# Patient Record
Sex: FEMALE | Race: WHITE | Employment: UNEMPLOYED | URBAN - METROPOLITAN AREA
[De-identification: names, ages, dates, MRNs, and addresses within clinical notes are randomized per-mention and may not be internally consistent; named-entity substitution may affect disease eponyms.]

---

## 2022-04-15 ENCOUNTER — OFFICE VISIT (OUTPATIENT)
Dept: URGENT CARE | Age: 57
End: 2022-04-15
Payer: COMMERCIAL

## 2022-04-15 VITALS
TEMPERATURE: 97.7 F | DIASTOLIC BLOOD PRESSURE: 90 MMHG | RESPIRATION RATE: 14 BRPM | HEIGHT: 63 IN | HEART RATE: 93 BPM | SYSTOLIC BLOOD PRESSURE: 159 MMHG | OXYGEN SATURATION: 96 % | WEIGHT: 181 LBS | BODY MASS INDEX: 32.07 KG/M2

## 2022-04-15 DIAGNOSIS — Z11.59 SCREENING FOR VIRAL DISEASE: ICD-10-CM

## 2022-04-15 DIAGNOSIS — R05.9 COUGH: ICD-10-CM

## 2022-04-15 DIAGNOSIS — J40 BRONCHITIS: Primary | ICD-10-CM

## 2022-04-15 LAB — SARS-COV-2 PCR, POC: NEGATIVE

## 2022-04-15 PROCEDURE — 99203 OFFICE O/P NEW LOW 30 MIN: CPT | Performed by: FAMILY MEDICINE

## 2022-04-15 PROCEDURE — 87635 SARS-COV-2 COVID-19 AMP PRB: CPT | Performed by: FAMILY MEDICINE

## 2022-04-15 RX ORDER — AZITHROMYCIN 250 MG/1
TABLET, FILM COATED ORAL
Qty: 6 TABLET | Refills: 0 | Status: SHIPPED | OUTPATIENT
Start: 2022-04-15

## 2022-04-15 RX ORDER — BENZONATATE 200 MG/1
200 CAPSULE ORAL
Qty: 30 CAPSULE | Refills: 0 | Status: SHIPPED | OUTPATIENT
Start: 2022-04-15

## 2022-04-15 NOTE — PROGRESS NOTES
Yvette Hastings is a 61 y.o. female who presents with cough, chest congestion, runny nose x 3 days. Denies fever, SOB. Hx of smoking, gets recurrent bronchitis. The history is provided by the patient. History reviewed. No pertinent past medical history. History reviewed. No pertinent surgical history. History reviewed. No pertinent family history. Social History     Socioeconomic History    Marital status:      Spouse name: Not on file    Number of children: Not on file    Years of education: Not on file    Highest education level: Not on file   Occupational History    Not on file   Tobacco Use    Smoking status: Former Smoker     Years: 40.00     Quit date: 2022     Years since quittin.2    Smokeless tobacco: Never Used   Substance and Sexual Activity    Alcohol use: Not on file    Drug use: Not on file    Sexual activity: Not on file   Other Topics Concern    Not on file   Social History Narrative    Not on file     Social Determinants of Health     Financial Resource Strain:     Difficulty of Paying Living Expenses: Not on file   Food Insecurity:     Worried About [address] in the Last Year: Not on file    [address] in the Last Year: Not on file   Transportation Needs:     Lack of Transportation (Medical): Not on file    Lack of Transportation (Non-Medical):  Not on file   Physical Activity:     Days of Exercise per Week: Not on file    Minutes of Exercise per Session: Not on file   Stress:     Feeling of Stress : Not on file   Social Connections:     Frequency of Communication with Friends and Family: Not on file    Frequency of Social Gatherings with Friends and Family: Not on file    Attends Spiritism Services: Not on file    Active Member of Clubs or Organizations: Not on file    Attends Club or Organization Meetings: Not on file    Marital Status: Not on file   Intimate Partner Violence:     Fear of Current or Ex-Partner: Not on file    Emotionally Abused: Not on file    Physically Abused: Not on file    Sexually Abused: Not on file   Housing Stability:     Unable to Pay for Housing in the Last Year: Not on file    Number of Places Lived in the Last Year: Not on file    Unstable Housing in the Last Year: Not on file                ALLERGIES: Codeine    Review of Systems   Constitutional: Negative for activity change, appetite change and fever. HENT: Positive for congestion, rhinorrhea and sore throat. Respiratory: Positive for cough. Negative for shortness of breath. Gastrointestinal: Negative for diarrhea, nausea and vomiting. Neurological: Positive for headaches. Vitals:    04/15/22 0936   BP: (!) 159/90   Pulse: 93   Resp: 14   Temp: 97.7 °F (36.5 °C)   SpO2: 96%   Weight: 181 lb (82.1 kg)   Height: 5' 3\" (1.6 m)       Physical Exam  Vitals and nursing note reviewed. Constitutional:       General: She is not in acute distress. Appearance: She is well-developed. She is not diaphoretic. HENT:      Right Ear: Tympanic membrane, ear canal and external ear normal.      Left Ear: Tympanic membrane, ear canal and external ear normal.      Nose: Congestion present. Right Sinus: No maxillary sinus tenderness or frontal sinus tenderness. Left Sinus: No maxillary sinus tenderness or frontal sinus tenderness. Mouth/Throat:      Pharynx: No oropharyngeal exudate or posterior oropharyngeal erythema. Tonsils: No tonsillar abscesses. Cardiovascular:      Rate and Rhythm: Normal rate and regular rhythm. Heart sounds: Normal heart sounds. Pulmonary:      Effort: Pulmonary effort is normal. No respiratory distress. Breath sounds: Normal breath sounds. No stridor. No wheezing, rhonchi or rales. Lymphadenopathy:      Cervical: No cervical adenopathy. Neurological:      Mental Status: She is alert. Psychiatric:         Behavior: Behavior normal.         Thought Content:  Thought content normal.         Judgment: Judgment normal.         MDM    ICD-10-CM ICD-9-CM   1. Bronchitis  J40 490   2. Cough  R05.9 786.2   3. Screening for viral disease  Z11.59 V73.99       Orders Placed This Encounter    POCT COVID-19, SARS-COV-2, PCR     Order Specific Question:   Is this test for diagnosis or screening? Answer:   Diagnosis of ill patient     Order Specific Question:   Symptomatic for COVID-19 as defined by CDC? Answer:   Yes     Order Specific Question:   Date of Symptom Onset     Answer:   4/12/2022     Order Specific Question:   Hospitalized for COVID-19? Answer:   No     Order Specific Question:   Admitted to ICU for COVID-19? Answer:   No     Order Specific Question:   Employed in healthcare setting? Answer:   Unknown     Order Specific Question:   Resident in a congregate (group) care setting? Answer:   No     Order Specific Question:   Pregnant? Answer:   No     Order Specific Question:   Previously tested for COVID-19? Answer:   Unknown    azithromycin (ZITHROMAX) 250 mg tablet     Sig: Take two tablets today then one tablet daily     Dispense:  6 Tablet     Refill:  0    benzonatate (TESSALON) 200 mg capsule     Sig: Take 1 Capsule by mouth three (3) times daily as needed for Cough. Dispense:  30 Capsule     Refill:  0        The patient is to follow up with PCP [facility]    If signs and symptoms become worse the pt is to go to the ER.      Results for orders placed or performed in visit on 04/15/22   POCT COVID-19, SARS-COV-2, PCR   Result Value Ref Range    SARS-COV-2 PCR, POC Negative Negative       Procedures

## 2022-05-25 ENCOUNTER — OFFICE VISIT (OUTPATIENT)
Dept: URGENT CARE | Age: 57
End: 2022-05-25
Payer: COMMERCIAL

## 2022-05-25 VITALS
TEMPERATURE: 98 F | DIASTOLIC BLOOD PRESSURE: 70 MMHG | OXYGEN SATURATION: 96 % | HEART RATE: 89 BPM | SYSTOLIC BLOOD PRESSURE: 165 MMHG | RESPIRATION RATE: 18 BRPM

## 2022-05-25 DIAGNOSIS — J20.9 ACUTE BRONCHITIS, UNSPECIFIED ORGANISM: Primary | ICD-10-CM

## 2022-05-25 PROCEDURE — 99214 OFFICE O/P EST MOD 30 MIN: CPT | Performed by: FAMILY MEDICINE

## 2022-05-25 RX ORDER — GUAIFENESIN AND DEXTROMETHORPHAN HYDROBROMIDE 1200; 60 MG/1; MG/1
1 TABLET, EXTENDED RELEASE ORAL 2 TIMES DAILY
Qty: 20 TABLET | Refills: 0 | Status: SHIPPED | OUTPATIENT
Start: 2022-05-25

## 2022-05-25 RX ORDER — ALBUTEROL SULFATE 90 UG/1
2 AEROSOL, METERED RESPIRATORY (INHALATION)
Qty: 18 G | Refills: 0 | Status: SHIPPED | OUTPATIENT
Start: 2022-05-25

## 2022-05-25 RX ORDER — PREDNISONE 10 MG/1
TABLET ORAL
Qty: 21 TABLET | Refills: 0 | Status: SHIPPED | OUTPATIENT
Start: 2022-05-25

## 2022-05-25 RX ORDER — CEFDINIR 300 MG/1
300 CAPSULE ORAL 2 TIMES DAILY
Qty: 20 CAPSULE | Refills: 0 | Status: SHIPPED | OUTPATIENT
Start: 2022-05-25

## 2022-05-25 NOTE — PROGRESS NOTES
Cough  The history is provided by the patient. This is a new problem. The current episode started more than 1 week ago. The problem occurs every few minutes. The problem has not changed (since last visit 3 weeks before) since onset. The cough is productive of sputum. There has been no fever. Pertinent negatives include no chest pain, no sore throat, no myalgias, no shortness of breath, no wheezing, no nausea and no vomiting. She has tried nothing for the symptoms. She is a smoker (quit in ). Her past medical history is significant for bronchitis. Her past medical history does not include asthma. History reviewed. No pertinent past medical history. History reviewed. No pertinent surgical history. History reviewed. No pertinent family history. Social History     Socioeconomic History    Marital status:      Spouse name: Not on file    Number of children: Not on file    Years of education: Not on file    Highest education level: Not on file   Occupational History    Not on file   Tobacco Use    Smoking status: Former Smoker     Years: 40.00     Quit date: 2022     Years since quittin.3    Smokeless tobacco: Never Used   Substance and Sexual Activity    Alcohol use: Not on file    Drug use: Not on file    Sexual activity: Not on file   Other Topics Concern    Not on file   Social History Narrative    Not on file     Social Determinants of Health     Financial Resource Strain:     Difficulty of Paying Living Expenses: Not on file   Food Insecurity:     Worried About [address] in the Last Year: Not on file    [address] in the Last Year: Not on file   Transportation Needs:     Lack of Transportation (Medical): Not on file    Lack of Transportation (Non-Medical):  Not on file   Physical Activity:     Days of Exercise per Week: Not on file    Minutes of Exercise per Session: Not on file   Stress:     Feeling of Stress : Not on file   Social Connections:     Frequency of Communication with Friends and Family: Not on file    Frequency of Social Gatherings with Friends and Family: Not on file    Attends Jainism Services: Not on file    Active Member of Clubs or Organizations: Not on file    Attends Club or Organization Meetings: Not on file    Marital Status: Not on file   Intimate Partner Violence:     Fear of Current or Ex-Partner: Not on file    Emotionally Abused: Not on file    Physically Abused: Not on file    Sexually Abused: Not on file   Housing Stability:     Unable to Pay for Housing in the Last Year: Not on file    Number of [place] in the Last Year: Not on file    Unstable Housing in the Last Year: Not on file                ALLERGIES: Codeine    Review of Systems   HENT: Negative for sore throat. Respiratory: Positive for cough. Negative for shortness of breath and wheezing. Cardiovascular: Negative for chest pain. Gastrointestinal: Negative for nausea and vomiting. Musculoskeletal: Negative for myalgias. All other systems reviewed and are negative. Vitals:    05/25/22 1018   BP: (!) 165/70   Pulse: 89   Resp: 18   Temp: 98 °F (36.7 °C)   SpO2: 96%       Physical Exam  Vitals and nursing note reviewed. Constitutional:       General: She is not in acute distress. Appearance: She is not ill-appearing. Pulmonary:      Effort: Pulmonary effort is normal. No respiratory distress. Breath sounds: Decreased breath sounds present. No wheezing or rhonchi. Comments: Bronchial breathing           MDM    Procedures        ICD-10-CM ICD-9-CM    1. Acute bronchitis, unspecified organism  J20.9 466.0      Medications Ordered Today   Medications    albuterol (PROVENTIL HFA, VENTOLIN HFA, PROAIR HFA) 90 mcg/actuation inhaler     Sig: Take 2 Puffs by inhalation every six (6) hours as needed for Wheezing.      Dispense:  18 g     Refill:  0    dextromethorphan-guaiFENesin (Mucinex DM) 60-1,200 mg Tb12 Sig: Take 1 Tablet by mouth two (2) times a day. Dispense:  20 Tablet     Refill:  0    predniSONE (STERAPRED DS) 10 mg dose pack     Sig: As directed     Dispense:  21 Tablet     Refill:  0    cefdinir (OMNICEF) 300 mg capsule     Sig: Take 1 Capsule by mouth two (2) times a day. Dispense:  20 Capsule     Refill:  0     No results found for any visits on 05/25/22. The patients condition was discussed with the patient and they understand. The patient is to follow up with primary care doctor. If signs and symptoms become worse the pt is to go to the ER. The patient is to take medications as prescribed.

## 2023-05-19 RX ORDER — AZITHROMYCIN 250 MG/1
TABLET, FILM COATED ORAL
COMMUNITY
Start: 2022-04-15

## 2023-05-19 RX ORDER — CEFDINIR 300 MG/1
300 CAPSULE ORAL 2 TIMES DAILY
COMMUNITY
Start: 2022-05-25

## 2023-05-19 RX ORDER — BENZONATATE 200 MG/1
200 CAPSULE ORAL 3 TIMES DAILY PRN
COMMUNITY
Start: 2022-04-15

## 2023-05-19 RX ORDER — ALBUTEROL SULFATE 90 UG/1
2 AEROSOL, METERED RESPIRATORY (INHALATION) EVERY 6 HOURS PRN
COMMUNITY
Start: 2022-05-25

## 2023-05-19 RX ORDER — PREDNISONE 10 MG/1
TABLET ORAL
COMMUNITY
Start: 2022-05-25

## 2023-05-19 RX ORDER — GUAIFENESIN AND DEXTROMETHORPHAN HYDROBROMIDE 1200; 60 MG/1; MG/1
1 TABLET, EXTENDED RELEASE ORAL 2 TIMES DAILY
COMMUNITY
Start: 2022-05-25

## 2023-11-06 ENCOUNTER — OFFICE VISIT (OUTPATIENT)
Age: 58
End: 2023-11-06

## 2023-11-06 VITALS
TEMPERATURE: 97.7 F | HEART RATE: 88 BPM | DIASTOLIC BLOOD PRESSURE: 84 MMHG | WEIGHT: 193.5 LBS | BODY MASS INDEX: 34.28 KG/M2 | SYSTOLIC BLOOD PRESSURE: 170 MMHG | OXYGEN SATURATION: 96 %

## 2023-11-06 DIAGNOSIS — U07.1 COVID-19: Primary | ICD-10-CM

## 2023-11-06 LAB
INFLUENZA A ANTIGEN, POC: NEGATIVE
INFLUENZA B ANTIGEN, POC: NEGATIVE
Lab: ABNORMAL
QC PASS/FAIL: ABNORMAL
SARS-COV-2 RDRP RESP QL NAA+PROBE: POSITIVE
VALID INTERNAL CONTROL, POC: NORMAL

## 2023-11-06 RX ORDER — BENZONATATE 200 MG/1
200 CAPSULE ORAL 3 TIMES DAILY PRN
Qty: 30 CAPSULE | Refills: 0 | Status: SHIPPED | OUTPATIENT
Start: 2023-11-06

## 2023-11-06 NOTE — PROGRESS NOTES
Jacqueline Tyler (:  1965) is a 60 y.o. female,Established patient, here for evaluation of the following chief complaint(s):  URI ([id number] )      ASSESSMENT/PLAN:  Visit Diagnoses and Associated Orders       COVID-19    -  Primary    POCT COVID-19 Rapid, NAAT [LHD016 Custom]      AMB POC INFLUENZA A  AND B REAL-TIME RT-PCR [72421 CPT(R)]      benzonatate (TESSALON) 200 MG capsule [73049]      nirmatrelvir/ritonavir 300/100 (PAXLOVID, 300/100,) 20 x 150 MG & 10 x 100MG TBPK [305544]                   The patient was counseled for a POSITIVE test result for COVID-19. The following information was given to the patient:    The COVID-19 test result was positive. Mild and stable symptoms are managed at home. Day 0 is your first day of symptoms or a positive test.  Day 1 is the first day after your symptoms started or your test specimen was collected. If you have COVID-19 or have symptoms  Stay home for at least 5 days and isolate from others in your home. Wear a well-fitted mask if you must be around others in your home. End isolation after 5 full days if you are fever-free for 24 hours (without the use of fever-reducing medication) and your symptoms are improving. If you did NOT have symptoms  End isolation after at least 5 full days after your positive test.  Wear a well-fitted mask for 10 full days any time you are around others inside your home or in public. Do not go to places where you are unable to wear a mask. If you were severely ill with COVID-19, you should isolate for at least 10 days. Consult your doctor before ending isolation. Contact your medical provider if symptoms are worsening, such as difficulty breathing.     To prevent spreading COVID  Avoid travel and avoid being around people who are at high risk  Wash hands often with soap and water for at least 20 seconds or alternatively use hand  with at least 60% alcohol content  Cover coughs and sneezes  Wear a mask when around

## 2023-11-06 NOTE — PATIENT INSTRUCTIONS
Patient Education        Learning About Coronavirus [phone])  What is COVID-19? COVID-19 is a disease caused by a type of coronavirus. This illness was first found in 2019 and has since spread worldwide (pandemic). Symptoms can range from mild, such as fever and body aches, to severe, including trouble breathing. COVID-19 can be deadly. Coronaviruses are a large group of viruses. Some types cause the common cold. Others cause more serious illnesses like [place] respiratory syndrome (MERS) and severe acute respiratory syndrome (SARS). What are the symptoms? COVID-19 symptoms may include:  Fever. Cough. Trouble breathing. Chills or repeated shaking with chills. Muscle and body aches. Headache. Sore throat. New loss of taste or smell. Vomiting. Diarrhea. Stuffy or runny nose. In severe cases, COVID-19 can cause pneumonia and make it hard to breathe without help from a machine. It can cause death. How is it diagnosed? COVID-19 is diagnosed with a viral test. This may also be called a PCR test or antigen test. It looks for evidence of the virus in your breathing passages or lungs (respiratory system). The test is most often done on a sample from the nose, throat, or lungs. It's sometimes done on a sample of saliva. One way a sample is collected is by rubbing the swab in a [place] in each nostril. If you have questions about testing, ask your doctor or check the CDC website at cdc.gov for information. How is it treated? A mild case of COVID-19 can usually be treated at home. Over-the-counter medicine like acetaminophen (Tylenol) or ibuprofen (Advil) can help relieve your symptoms. But even if your symptoms are mild, talk to your doctor right away. Medicines, such as antivirals, can help reduce the risk of serious illness. But you need to take them within a few days after symptoms start. There may be other options if antivirals aren't a good choice for you.   If you do get very sick, you will